# Patient Record
Sex: FEMALE | URBAN - METROPOLITAN AREA
[De-identification: names, ages, dates, MRNs, and addresses within clinical notes are randomized per-mention and may not be internally consistent; named-entity substitution may affect disease eponyms.]

---

## 2021-04-12 ENCOUNTER — OFFICE VISIT (OUTPATIENT)
Dept: URBAN - METROPOLITAN AREA CLINIC 76 | Facility: CLINIC | Age: 66
End: 2021-04-12
Payer: COMMERCIAL

## 2021-04-12 DIAGNOSIS — H52.4 PRESBYOPIA: Primary | ICD-10-CM

## 2021-04-12 DIAGNOSIS — H25.13 AGE-RELATED NUCLEAR CATARACT, BILATERAL: ICD-10-CM

## 2021-04-12 PROCEDURE — 92004 COMPRE OPH EXAM NEW PT 1/>: CPT | Performed by: OPTOMETRIST

## 2021-04-12 ASSESSMENT — INTRAOCULAR PRESSURE
OD: 16
OS: 16

## 2021-04-12 ASSESSMENT — KERATOMETRY
OS: 46.25
OD: 46.38

## 2021-04-12 ASSESSMENT — VISUAL ACUITY
OD: 20/25
OS: 20/25

## 2021-04-12 NOTE — IMPRESSION/PLAN
Impression: Presbyopia: H52.4. Plan: Recommend single vision reading glasses or continue use of OTC readers. Dispensed Rx to patient.